# Patient Record
Sex: MALE | Race: OTHER | Employment: FULL TIME | URBAN - METROPOLITAN AREA
[De-identification: names, ages, dates, MRNs, and addresses within clinical notes are randomized per-mention and may not be internally consistent; named-entity substitution may affect disease eponyms.]

---

## 2017-02-22 PROCEDURE — 82043 UR ALBUMIN QUANTITATIVE: CPT | Performed by: INTERNAL MEDICINE

## 2017-02-22 PROCEDURE — 82570 ASSAY OF URINE CREATININE: CPT | Performed by: INTERNAL MEDICINE

## 2017-11-20 PROBLEM — E11.9 CONTROLLED TYPE 2 DIABETES MELLITUS WITHOUT COMPLICATION, WITHOUT LONG-TERM CURRENT USE OF INSULIN (HCC): Status: ACTIVE | Noted: 2017-11-20

## 2018-10-26 PROCEDURE — 82570 ASSAY OF URINE CREATININE: CPT | Performed by: INTERNAL MEDICINE

## 2018-10-26 PROCEDURE — 82043 UR ALBUMIN QUANTITATIVE: CPT | Performed by: INTERNAL MEDICINE

## 2019-10-09 ENCOUNTER — HOSPITAL ENCOUNTER (EMERGENCY)
Facility: HOSPITAL | Age: 34
Discharge: HOME | End: 2019-10-09
Attending: EMERGENCY MEDICINE
Payer: COMMERCIAL

## 2019-10-09 ENCOUNTER — APPOINTMENT (OUTPATIENT)
Dept: GENERAL RADIOLOGY | Facility: HOSPITAL | Age: 34
End: 2019-10-09
Payer: COMMERCIAL

## 2019-10-09 VITALS
TEMPERATURE: 97 F | SYSTOLIC BLOOD PRESSURE: 131 MMHG | OXYGEN SATURATION: 100 % | HEART RATE: 67 BPM | DIASTOLIC BLOOD PRESSURE: 76 MMHG | RESPIRATION RATE: 21 BRPM

## 2019-10-09 DIAGNOSIS — R00.2 PALPITATIONS: Primary | ICD-10-CM

## 2019-10-09 PROCEDURE — 84484 ASSAY OF TROPONIN QUANT: CPT

## 2019-10-09 PROCEDURE — 36415 COLL VENOUS BLD VENIPUNCTURE: CPT

## 2019-10-09 PROCEDURE — 99285 EMERGENCY DEPT VISIT HI MDM: CPT

## 2019-10-09 PROCEDURE — 71045 X-RAY EXAM CHEST 1 VIEW: CPT

## 2019-10-09 PROCEDURE — 80053 COMPREHEN METABOLIC PANEL: CPT

## 2019-10-09 PROCEDURE — 93010 ELECTROCARDIOGRAM REPORT: CPT

## 2019-10-09 PROCEDURE — 99284 EMERGENCY DEPT VISIT MOD MDM: CPT

## 2019-10-09 PROCEDURE — 85379 FIBRIN DEGRADATION QUANT: CPT | Performed by: EMERGENCY MEDICINE

## 2019-10-09 PROCEDURE — 85025 COMPLETE CBC W/AUTO DIFF WBC: CPT | Performed by: EMERGENCY MEDICINE

## 2019-10-09 PROCEDURE — 85025 COMPLETE CBC W/AUTO DIFF WBC: CPT

## 2019-10-09 PROCEDURE — 80053 COMPREHEN METABOLIC PANEL: CPT | Performed by: EMERGENCY MEDICINE

## 2019-10-09 PROCEDURE — 93005 ELECTROCARDIOGRAM TRACING: CPT

## 2019-10-09 PROCEDURE — 82962 GLUCOSE BLOOD TEST: CPT

## 2019-10-09 PROCEDURE — 84484 ASSAY OF TROPONIN QUANT: CPT | Performed by: EMERGENCY MEDICINE

## 2019-10-09 RX ORDER — ASPIRIN 81 MG/1
324 TABLET, CHEWABLE ORAL ONCE
Status: COMPLETED | OUTPATIENT
Start: 2019-10-09 | End: 2019-10-09

## 2019-10-10 NOTE — ED PROVIDER NOTES
Patient Seen in: [facility] Emergency Department      History   Patient presents with:  Chest Pain Angina (cardiovascular)    Stated Complaint: Chest Pain    HPI    CHIEF COMPLAINT: Chest palpitation left arm numbness    HISTORY OF PRESENT ILLNESS: The patient's family history is reviewed and is noncontributory to the presenting problem, except as indicated as above. Chest palpitations, left arm numbness    Past Medical History:   Diagnosis Date   • Diabetes ([facility] [address]               History reviewed.  No Notable for the following components:       Result Value    BUN/CREA Ratio 6.2 (*)     Total Protein 8.6 (*)     All other components within normal limits   POCT GLUCOSE - Abnormal; Notable for the following components:    POC Glucose 101 (*)     All other (cpt=71045)    Result Date: 10/9/2019  CONCLUSION:  No acute abnormality is seen. If clinical symptoms persist consider followup radiographs or CT.    Dictated by: Chandu Cramer MD on 10/09/2019 at 20:08     Approved by: MD EMY Joshi possible for a visit in 2 days  For reevaluation        Medications Prescribed:  Current Discharge Medication List

## 2019-10-10 NOTE — ED INITIAL ASSESSMENT (HPI)
Patient with CP and palpitations since yesterday.  States pain radiates down left arm, also with SOB

## 2021-05-05 PROBLEM — M10.072 ACUTE IDIOPATHIC GOUT OF LEFT FOOT: Status: ACTIVE | Noted: 2021-05-05

## 2021-05-27 PROBLEM — M10.072 ACUTE IDIOPATHIC GOUT OF LEFT FOOT: Status: RESOLVED | Noted: 2021-05-05 | Resolved: 2021-05-27

## 2021-05-27 PROBLEM — M1A.00X0 IDIOPATHIC CHRONIC GOUT WITHOUT TOPHUS, UNSPECIFIED SITE: Status: ACTIVE | Noted: 2021-05-27

## (undated) NOTE — ED AVS SNAPSHOT
Asuncion Corea   MRN: [id number]    Department:  [facility] Emergency Department   Date of Visit:  10/9/2019           Disclosure     Insurance plans vary and the physician(s) referred by the ER may not be covered by your plan.  Please cont tell this physician (or your personal doctor if your instructions are to return to your personal doctor) about any new or lasting problems. The primary care or specialist physician will see patients referred from the [facility] Emergency Department.  Johnson Valenzuela